# Patient Record
Sex: FEMALE | Race: WHITE | HISPANIC OR LATINO
[De-identification: names, ages, dates, MRNs, and addresses within clinical notes are randomized per-mention and may not be internally consistent; named-entity substitution may affect disease eponyms.]

---

## 2018-10-17 PROBLEM — Z00.00 ENCOUNTER FOR PREVENTIVE HEALTH EXAMINATION: Status: ACTIVE | Noted: 2018-10-17

## 2018-10-23 ENCOUNTER — APPOINTMENT (OUTPATIENT)
Dept: NEUROLOGY | Facility: CLINIC | Age: 53
End: 2018-10-23
Payer: COMMERCIAL

## 2018-10-23 VITALS
OXYGEN SATURATION: 98 % | DIASTOLIC BLOOD PRESSURE: 98 MMHG | SYSTOLIC BLOOD PRESSURE: 170 MMHG | TEMPERATURE: 98.1 F | HEART RATE: 74 BPM

## 2018-10-23 DIAGNOSIS — E11.9 TYPE 2 DIABETES MELLITUS W/OUT COMPLICATIONS: ICD-10-CM

## 2018-10-23 DIAGNOSIS — I10 ESSENTIAL (PRIMARY) HYPERTENSION: ICD-10-CM

## 2018-10-23 DIAGNOSIS — Z78.9 OTHER SPECIFIED HEALTH STATUS: ICD-10-CM

## 2018-10-23 PROCEDURE — 99205 OFFICE O/P NEW HI 60 MIN: CPT

## 2018-10-23 RX ORDER — METFORMIN HYDROCHLORIDE 1000 MG/1
1000 TABLET, COATED ORAL
Refills: 0 | Status: ACTIVE | COMMUNITY
Start: 2018-10-23

## 2019-05-07 ENCOUNTER — APPOINTMENT (OUTPATIENT)
Dept: NEUROLOGY | Facility: CLINIC | Age: 54
End: 2019-05-07
Payer: COMMERCIAL

## 2019-05-07 VITALS
OXYGEN SATURATION: 96 % | HEIGHT: 62 IN | DIASTOLIC BLOOD PRESSURE: 67 MMHG | RESPIRATION RATE: 16 BRPM | TEMPERATURE: 98 F | BODY MASS INDEX: 27.23 KG/M2 | WEIGHT: 148 LBS | SYSTOLIC BLOOD PRESSURE: 129 MMHG | HEART RATE: 78 BPM

## 2019-05-07 PROCEDURE — 99215 OFFICE O/P EST HI 40 MIN: CPT

## 2019-05-07 NOTE — HISTORY OF PRESENT ILLNESS
[FreeTextEntry1] : Mrs. Levi is a 54 year old woman with an incidental right cavernous ICA aneurysm.  Last MRA head showed the aneurysm , 4.5 mm x 4.2 mm right cavernous ICA aneurysm with a 3.8 mm neck projecting inferiorly. She reports having migraines, she believes related to stress and is on Topamax. She is followed by Dr. Rowell for the headaches. She denies any problems with vision at this time. Repeat MRA from 4/22/19 was reviewed by me and shows no change in the size or configuration of the aneurysm.

## 2019-05-07 NOTE — DISCUSSION/SUMMARY
[FreeTextEntry1] : Mrs. Pinon is a 54 year old woman who presents with an incidental 4.5 mm x 4.2 mm cavernous right ICA aneurysm discovered by MRA performed for migraine headaches. Repeat MRA head on 4/22/19 shows no changes. As discussed at prior visit, there is no chance of this aneurysm causing a subarachnoid hemorrhage and if it bled, it would cause a cavernous carotid fistula. As such, no intervention is required for this aneurysm at this time and conservative management is recommended. We discussed the importance of risk factor modifications such as blood pressure control and exercise. Plan for repeat MRA in 2 years and I will see her again at that time. All questions and concerns addressed.

## 2019-05-07 NOTE — PHYSICAL EXAM
[General Appearance - Well Nourished] : well nourished [General Appearance - Alert] : alert [General Appearance - Well Developed] : well developed [Oriented To Time, Place, And Person] : oriented to person, place, and time [Affect] : the affect was normal [Mood] : the mood was normal [Person] : oriented to person [Place] : oriented to place [Time] : oriented to time [Concentration Intact] : normal concentrating ability [Visual Intact] : visual attention was ~T not ~L decreased [Naming Objects] : no difficulty naming common objects [Writing A Sentence] : no difficulty writing a sentence [Repeating Phrases] : no difficulty repeating a phrase [Fluency] : fluency intact [Comprehension] : comprehension intact [Past History] : adequate knowledge of personal past history [Reading] : reading intact [Cranial Nerves Oculomotor (III)] : extraocular motion intact [Cranial Nerves Optic (II)] : visual acuity intact bilaterally,  visual fields full to confrontation, pupils equal round and reactive to light [Cranial Nerves Trigeminal (V)] : facial sensation intact symmetrically [Cranial Nerves Facial (VII)] : face symmetrical [Cranial Nerves Vestibulocochlear (VIII)] : hearing was intact bilaterally [Cranial Nerves Glossopharyngeal (IX)] : tongue and palate midline [Cranial Nerves Accessory (XI - Cranial And Spinal)] : head turning and shoulder shrug symmetric [Cranial Nerves Hypoglossal (XII)] : there was no tongue deviation with protrusion [Motor Tone] : muscle tone was normal in all four extremities [Motor Strength] : muscle strength was normal in all four extremities [Motor Handedness Right-Handed] : the patient is right hand dominant [No Muscle Atrophy] : normal bulk in all four extremities [Sensation Tactile Decrease] : light touch was intact [Balance] : balance was intact [2+] : Ankle jerk left 2+ [Extraocular Movements] : extraocular movements were intact [Optic Disc Abnormality] : the optic disc were normal in size and color [Full Visual Field] : full visual field [Hearing Threshold Finger Rub Not Miner] : hearing was normal [Normal] : normal [Neck Appearance] : the appearance of the neck was normal [] : no respiratory distress [Heart Rate And Rhythm] : heart rate was normal and rhythm regular [Edema] : there was no peripheral edema [Abdomen Soft] : soft [Abnormal Walk] : normal gait [Involuntary Movements] : no involuntary movements were seen [Skin Color & Pigmentation] : normal skin color and pigmentation [Musculoskeletal - Swelling] : no joint swelling seen [Skin Turgor] : normal skin turgor [Paresis Pronator Drift Right-Sided] : no pronator drift on the right [Motor Strength Upper Extremities Bilaterally] : strength was normal in both upper extremities [Motor Strength Lower Extremities Bilaterally] : strength was normal in both lower extremities [Paresis Pronator Drift Left-Sided] : no pronator drift on the left [Romberg's Sign] : Romberg's sign was negtive [Plantar Reflex Left Only] : normal on the left [Plantar Reflex Right Only] : normal on the right

## 2019-05-07 NOTE — CONSULT LETTER
[Dear  ___] : Dear  [unfilled], [Please see my note below.] : Please see my note below. [Consult Letter:] : I had the pleasure of evaluating your patient, [unfilled]. [Consult Closing:] : Thank you very much for allowing me to participate in the care of this patient.  If you have any questions, please do not hesitate to contact me. [Sincerely,] : Sincerely, [FreeTextEntry3] : Jose Lopez MD\par Chief, Vascular Neurology\par  of Neurology and Radiology\par [facility]\par Director, Comprehensive Stroke Center and Stroke Unit\par [facility]\par Attending, Interventional Neuroradiology\par \par

## 2019-05-07 NOTE — REVIEW OF SYSTEMS
[Tension Headache] : tension-type headaches [Fever] : no fever [Feeling Tired] : not feeling tired [Chills] : no chills [Feeling Poorly] : not feeling poorly [Memory Lapses or Loss] : no memory loss [Confused or Disoriented] : no confusion [Difficulty with Language] : no ~M difficulty with language [Decr. Concentrating Ability] : no decrease in concentrating ability [Changed Thought Patterns] : no change in thought patterns [Repeating Questions] : no repeated questioning about recent events [Arm Weakness] : no arm weakness [Facial Weakness] : no facial weakness [Leg Weakness] : no leg weakness [Hand Weakness] : no hand weakness [Difficulty Writing] : no difficulty writing [Difficulties in Speech] : no speech difficulties [Poor Coordination] : good coordination [Numbness] : no numbness [Tingling] : no tingling [Dizziness] : no dizziness [Seizures] : no convulsions [Lightheadedness] : no lightheadedness [Vertigo] : no vertigo [Fainting] : no fainting [Inability to Walk] : able to walk [Difficulty Walking] : no difficulty walking [Ataxia] : no ataxia [Suicidal] : not suicidal [Frequent Falls] : not falling [Anxiety] : no anxiety [Depression] : no depression [Eye Pain] : no eye pain [Earache] : no earache [Loss Of Hearing] : no hearing loss [Red Eyes] : eyes not red [Nosebleeds] : no nosebleeds [Wheezing] : no wheezing [Shortness Of Breath] : no shortness of breath [Cough] : no cough [Abdominal Pain] : no abdominal pain [Vomiting] : no vomiting [Constipation] : no constipation [Dysuria] : no dysuria [Incontinence] : no incontinence [Diarrhea] : no diarrhea [Joint Swelling] : no joint swelling [Joint Pain] : no joint pain [Skin Wound] : no skin wound [Joint Stiffness] : no joint stiffness [Skin Lesions] : no skin lesions [Easy Bleeding] : no tendency for easy bleeding

## 2020-09-29 ENCOUNTER — NON-APPOINTMENT (OUTPATIENT)
Age: 55
End: 2020-09-29

## 2020-09-29 ENCOUNTER — APPOINTMENT (OUTPATIENT)
Dept: OPHTHALMOLOGY | Facility: CLINIC | Age: 55
End: 2020-09-29
Payer: COMMERCIAL

## 2020-09-29 PROCEDURE — 92133 CPTRZD OPH DX IMG PST SGM ON: CPT

## 2020-09-29 PROCEDURE — 99203 OFFICE O/P NEW LOW 30 MIN: CPT

## 2020-09-29 PROCEDURE — 92083 EXTENDED VISUAL FIELD XM: CPT

## 2021-05-12 ENCOUNTER — RESULT REVIEW (OUTPATIENT)
Age: 56
End: 2021-05-12

## 2021-10-06 PROBLEM — I10 ESSENTIAL HYPERTENSION: Status: ACTIVE | Noted: 2018-10-23

## 2022-11-15 ENCOUNTER — APPOINTMENT (OUTPATIENT)
Dept: NEUROLOGY | Facility: CLINIC | Age: 57
End: 2022-11-15

## 2022-11-15 VITALS
WEIGHT: 152 LBS | SYSTOLIC BLOOD PRESSURE: 152 MMHG | DIASTOLIC BLOOD PRESSURE: 83 MMHG | HEART RATE: 66 BPM | RESPIRATION RATE: 16 BRPM | BODY MASS INDEX: 27.97 KG/M2 | HEIGHT: 62 IN

## 2022-11-15 DIAGNOSIS — G43.909 MIGRAINE, UNSPECIFIED, NOT INTRACTABLE, W/OUT STATUS MIGRAINOSUS: ICD-10-CM

## 2022-11-15 PROCEDURE — 99204 OFFICE O/P NEW MOD 45 MIN: CPT

## 2022-11-15 RX ORDER — VENLAFAXINE HYDROCHLORIDE 37.5 MG/1
37.5 CAPSULE, EXTENDED RELEASE ORAL DAILY
Refills: 0 | Status: ACTIVE | COMMUNITY

## 2022-11-15 RX ORDER — RAMIPRIL 10 MG/1
10 CAPSULE ORAL DAILY
Refills: 0 | Status: ACTIVE | COMMUNITY

## 2022-11-15 RX ORDER — MELOXICAM 15 MG/1
15 TABLET ORAL DAILY
Refills: 0 | Status: ACTIVE | COMMUNITY

## 2022-11-15 RX ORDER — LISINOPRIL 5 MG/1
5 TABLET ORAL DAILY
Refills: 0 | Status: DISCONTINUED | COMMUNITY
Start: 2018-10-23 | End: 2022-11-15

## 2022-11-15 NOTE — HISTORY OF PRESENT ILLNESS
[FreeTextEntry1] : Mrs. Levi is a 57 year old woman with an incidental right cavernous ICA aneurysm. Last MRA head on 2019 showed the aneurysm, 4.5 mm x 4.2 mm right cavernous ICA aneurysm with a 3.8 mm neck projecting inferiorly. She reports having a long history of migraines, on Effexor, looking for a new general neurologist as Dr. Rowell retired. She has not had any repeat imaging since 2019. She denies any stroke/TIA symptoms.

## 2022-11-15 NOTE — DISCUSSION/SUMMARY
[FreeTextEntry1] : Mrs. Pinon is a 57 year old woman who presents with an incidental 4.5 mm x 4.2 mm cavernous right ICA aneurysm discovered by MRA performed for migraine headaches. Repeat MRA head on 4/22/19 shows no changes. there is no role for treating this aneurysm unless it significantly enlarges. Plan for repeat MRA Head and will see her in the office after repeat imaging. I will refer to Dr. Carlos Romero for migraine management.  All questions and concerns addressed. \par

## 2022-11-15 NOTE — PHYSICAL EXAM

## 2022-11-15 NOTE — REVIEW OF SYSTEMS
[Tension Headache] : tension-type headaches [Fever] : no fever [Chills] : no chills [Feeling Poorly] : not feeling poorly [Feeling Tired] : not feeling tired [Confused or Disoriented] : no confusion [Memory Lapses or Loss] : no memory loss [Decr. Concentrating Ability] : no decrease in concentrating ability [Difficulty with Language] : no ~M difficulty with language [Changed Thought Patterns] : no change in thought patterns [Repeating Questions] : no repeated questioning about recent events [Facial Weakness] : no facial weakness [Arm Weakness] : no arm weakness [Hand Weakness] : no hand weakness [Leg Weakness] : no leg weakness [Poor Coordination] : good coordination [Difficulty Writing] : no difficulty writing [Difficulties in Speech] : no speech difficulties [Numbness] : no numbness [Tingling] : no tingling [Seizures] : no convulsions [Dizziness] : no dizziness [Fainting] : no fainting [Lightheadedness] : no lightheadedness [Vertigo] : no vertigo [Difficulty Walking] : no difficulty walking [Inability to Walk] : able to walk [Anxiety] : no anxiety [Depression] : no depression [Eyesight Problems] : no eyesight problems [Loss Of Hearing] : no hearing loss [Chest Pain] : no chest pain [Palpitations] : no palpitations [Shortness Of Breath] : no shortness of breath [Wheezing] : no wheezing [Cough] : no cough [Constipation] : no constipation [Diarrhea] : no diarrhea [Incontinence] : no incontinence [Joint Pain] : no joint pain [Easy Bleeding] : no tendency for easy bleeding [Easy Bruising] : no tendency for easy bruising

## 2022-11-27 ENCOUNTER — APPOINTMENT (OUTPATIENT)
Dept: MRI IMAGING | Facility: CLINIC | Age: 57
End: 2022-11-27

## 2022-11-27 ENCOUNTER — OUTPATIENT (OUTPATIENT)
Dept: OUTPATIENT SERVICES | Facility: HOSPITAL | Age: 57
LOS: 1 days | End: 2022-11-27
Payer: COMMERCIAL

## 2022-11-27 DIAGNOSIS — I67.1 CEREBRAL ANEURYSM, NONRUPTURED: ICD-10-CM

## 2022-11-27 PROCEDURE — 70544 MR ANGIOGRAPHY HEAD W/O DYE: CPT | Mod: 26

## 2022-11-27 PROCEDURE — 70544 MR ANGIOGRAPHY HEAD W/O DYE: CPT

## 2022-12-20 ENCOUNTER — APPOINTMENT (OUTPATIENT)
Dept: NEUROLOGY | Facility: CLINIC | Age: 57
End: 2022-12-20

## 2022-12-20 VITALS — SYSTOLIC BLOOD PRESSURE: 150 MMHG | DIASTOLIC BLOOD PRESSURE: 76 MMHG | HEART RATE: 73 BPM

## 2022-12-20 DIAGNOSIS — I67.1 CEREBRAL ANEURYSM, NONRUPTURED: ICD-10-CM

## 2022-12-20 PROCEDURE — 99215 OFFICE O/P EST HI 40 MIN: CPT

## 2022-12-20 RX ORDER — ERENUMAB-AOOE 70 MG/ML
70 INJECTION SUBCUTANEOUS
Refills: 0 | Status: ACTIVE | COMMUNITY

## 2022-12-20 NOTE — PHYSICAL EXAM

## 2022-12-20 NOTE — HISTORY OF PRESENT ILLNESS
[FreeTextEntry1] : Mrs. Levi is a 57 year old woman with a PMHx of HTN, DM and a known incidental right cavernous ICA aneurysm found while being worked up for migraines. Repeat MRA Head 12/2022: stable 5 mm right cavernous ICA aneurysm. Recent MRA compared to her MRA in 2019. She was evaluated by Dr. Mallory for her migraines and started on Aimovig injection. She denies any stroke/TIA symptoms.

## 2022-12-20 NOTE — DISCUSSION/SUMMARY
[FreeTextEntry1] : Mrs. Levi is a 57 year old woman with a PMHx of HTN, DM and a known incidental right cavernous ICA aneurysm. Repeat MRA Head compared to 2019 shows aneurysm is stable in size. We discussed there is no role for treatment at this time unless it significantly enlarges. Plan for repeat MRA HEAD in 1 year. She will continue to follow with Dr. Mallory for her migraines. All of her questions and concerns were addressed.

## 2022-12-20 NOTE — REVIEW OF SYSTEMS
[Fever] : no fever [Chills] : no chills [Feeling Poorly] : not feeling poorly [Feeling Tired] : not feeling tired [Confused or Disoriented] : no confusion [Memory Lapses or Loss] : no memory loss [Decr. Concentrating Ability] : no decrease in concentrating ability [Difficulty with Language] : no ~M difficulty with language [Changed Thought Patterns] : no change in thought patterns [Repeating Questions] : no repeated questioning about recent events [Facial Weakness] : no facial weakness [Arm Weakness] : no arm weakness [Hand Weakness] : no hand weakness [Leg Weakness] : no leg weakness [Poor Coordination] : good coordination [Difficulty Writing] : no difficulty writing [Difficulties in Speech] : no speech difficulties [Numbness] : no numbness [Seizures] : no convulsions [Dizziness] : no dizziness [Lightheadedness] : no lightheadedness [Vertigo] : no vertigo [Tension Headache] : no tension-type headache [Difficulty Walking] : no difficulty walking [Anxiety] : no anxiety [Depression] : no depression [Eyesight Problems] : no eyesight problems [Loss Of Hearing] : no hearing loss [Chest Pain] : no chest pain [Palpitations] : no palpitations [Shortness Of Breath] : no shortness of breath [Wheezing] : no wheezing [Vomiting] : no vomiting [Easy Bleeding] : no tendency for easy bleeding [Easy Bruising] : no tendency for easy bruising

## 2023-06-13 ENCOUNTER — INPATIENT (INPATIENT)
Facility: HOSPITAL | Age: 58
LOS: 1 days | Discharge: HOME | DRG: 313 | End: 2023-06-15
Attending: INTERNAL MEDICINE | Admitting: INTERNAL MEDICINE
Payer: COMMERCIAL

## 2023-06-13 VITALS
WEIGHT: 154.98 LBS | HEIGHT: 62 IN | SYSTOLIC BLOOD PRESSURE: 174 MMHG | HEART RATE: 68 BPM | OXYGEN SATURATION: 97 % | TEMPERATURE: 98 F | RESPIRATION RATE: 16 BRPM | DIASTOLIC BLOOD PRESSURE: 88 MMHG

## 2023-06-13 LAB
ALBUMIN SERPL ELPH-MCNC: 4.5 G/DL — SIGNIFICANT CHANGE UP (ref 3.3–5)
ALP SERPL-CCNC: 103 U/L — SIGNIFICANT CHANGE UP (ref 40–120)
ALT FLD-CCNC: 34 U/L — SIGNIFICANT CHANGE UP (ref 10–45)
ANION GAP SERPL CALC-SCNC: 12 MMOL/L — SIGNIFICANT CHANGE UP (ref 5–17)
APTT BLD: 32 SEC — SIGNIFICANT CHANGE UP (ref 27.5–35.5)
AST SERPL-CCNC: 27 U/L — SIGNIFICANT CHANGE UP (ref 10–40)
BASOPHILS # BLD AUTO: 0.05 K/UL — SIGNIFICANT CHANGE UP (ref 0–0.2)
BASOPHILS NFR BLD AUTO: 0.7 % — SIGNIFICANT CHANGE UP (ref 0–2)
BILIRUB SERPL-MCNC: 0.3 MG/DL — SIGNIFICANT CHANGE UP (ref 0.2–1.2)
BUN SERPL-MCNC: 14 MG/DL — SIGNIFICANT CHANGE UP (ref 7–23)
CALCIUM SERPL-MCNC: 9.5 MG/DL — SIGNIFICANT CHANGE UP (ref 8.4–10.5)
CHLORIDE SERPL-SCNC: 102 MMOL/L — SIGNIFICANT CHANGE UP (ref 96–108)
CO2 SERPL-SCNC: 24 MMOL/L — SIGNIFICANT CHANGE UP (ref 22–31)
CREAT SERPL-MCNC: 0.56 MG/DL — SIGNIFICANT CHANGE UP (ref 0.5–1.3)
D DIMER BLD IA.RAPID-MCNC: <150 NG/ML DDU — SIGNIFICANT CHANGE UP
EGFR: 105 ML/MIN/1.73M2 — SIGNIFICANT CHANGE UP
EOSINOPHIL # BLD AUTO: 0.21 K/UL — SIGNIFICANT CHANGE UP (ref 0–0.5)
EOSINOPHIL NFR BLD AUTO: 3 % — SIGNIFICANT CHANGE UP (ref 0–6)
GLUCOSE SERPL-MCNC: 158 MG/DL — HIGH (ref 70–99)
HCT VFR BLD CALC: 38.5 % — SIGNIFICANT CHANGE UP (ref 34.5–45)
HGB BLD-MCNC: 13 G/DL — SIGNIFICANT CHANGE UP (ref 11.5–15.5)
IMM GRANULOCYTES NFR BLD AUTO: 0.4 % — SIGNIFICANT CHANGE UP (ref 0–0.9)
INR BLD: 0.95 RATIO — SIGNIFICANT CHANGE UP (ref 0.88–1.16)
LIDOCAIN IGE QN: 25 U/L — SIGNIFICANT CHANGE UP (ref 7–60)
LYMPHOCYTES # BLD AUTO: 2.43 K/UL — SIGNIFICANT CHANGE UP (ref 1–3.3)
LYMPHOCYTES # BLD AUTO: 34.9 % — SIGNIFICANT CHANGE UP (ref 13–44)
MAGNESIUM SERPL-MCNC: 1.7 MG/DL — SIGNIFICANT CHANGE UP (ref 1.6–2.6)
MCHC RBC-ENTMCNC: 30.3 PG — SIGNIFICANT CHANGE UP (ref 27–34)
MCHC RBC-ENTMCNC: 33.8 GM/DL — SIGNIFICANT CHANGE UP (ref 32–36)
MCV RBC AUTO: 89.7 FL — SIGNIFICANT CHANGE UP (ref 80–100)
MONOCYTES # BLD AUTO: 0.63 K/UL — SIGNIFICANT CHANGE UP (ref 0–0.9)
MONOCYTES NFR BLD AUTO: 9 % — SIGNIFICANT CHANGE UP (ref 2–14)
NEUTROPHILS # BLD AUTO: 3.62 K/UL — SIGNIFICANT CHANGE UP (ref 1.8–7.4)
NEUTROPHILS NFR BLD AUTO: 52 % — SIGNIFICANT CHANGE UP (ref 43–77)
NRBC # BLD: 0 /100 WBCS — SIGNIFICANT CHANGE UP (ref 0–0)
PHOSPHATE SERPL-MCNC: 3.2 MG/DL — SIGNIFICANT CHANGE UP (ref 2.5–4.5)
PLATELET # BLD AUTO: 272 K/UL — SIGNIFICANT CHANGE UP (ref 150–400)
POTASSIUM SERPL-MCNC: 3.9 MMOL/L — SIGNIFICANT CHANGE UP (ref 3.5–5.3)
POTASSIUM SERPL-SCNC: 3.9 MMOL/L — SIGNIFICANT CHANGE UP (ref 3.5–5.3)
PROT SERPL-MCNC: 7.1 G/DL — SIGNIFICANT CHANGE UP (ref 6–8.3)
PROTHROM AB SERPL-ACNC: 11 SEC — SIGNIFICANT CHANGE UP (ref 10.5–13.4)
RBC # BLD: 4.29 M/UL — SIGNIFICANT CHANGE UP (ref 3.8–5.2)
RBC # FLD: 12.4 % — SIGNIFICANT CHANGE UP (ref 10.3–14.5)
SODIUM SERPL-SCNC: 138 MMOL/L — SIGNIFICANT CHANGE UP (ref 135–145)
TROPONIN T, HIGH SENSITIVITY RESULT: <6 NG/L — SIGNIFICANT CHANGE UP (ref 0–51)
TROPONIN T, HIGH SENSITIVITY RESULT: <6 NG/L — SIGNIFICANT CHANGE UP (ref 0–51)
WBC # BLD: 6.97 K/UL — SIGNIFICANT CHANGE UP (ref 3.8–10.5)
WBC # FLD AUTO: 6.97 K/UL — SIGNIFICANT CHANGE UP (ref 3.8–10.5)

## 2023-06-13 PROCEDURE — 71046 X-RAY EXAM CHEST 2 VIEWS: CPT | Mod: 26

## 2023-06-13 PROCEDURE — 99285 EMERGENCY DEPT VISIT HI MDM: CPT

## 2023-06-13 RX ORDER — ACETAMINOPHEN 500 MG
975 TABLET ORAL ONCE
Refills: 0 | Status: COMPLETED | OUTPATIENT
Start: 2023-06-13 | End: 2023-06-13

## 2023-06-13 RX ORDER — ASPIRIN/CALCIUM CARB/MAGNESIUM 324 MG
162 TABLET ORAL ONCE
Refills: 0 | Status: COMPLETED | OUTPATIENT
Start: 2023-06-13 | End: 2023-06-13

## 2023-06-13 RX ADMIN — Medication 975 MILLIGRAM(S): at 20:43

## 2023-06-13 RX ADMIN — Medication 162 MILLIGRAM(S): at 20:44

## 2023-06-13 NOTE — ED PROVIDER NOTE - ATTENDING CONTRIBUTION TO CARE
Attending MD Rinaldi:  I personally have seen and examined this patient. I have performed a substantive portion of the visit including all aspects of the medical decision making.  Resident note reviewed and agree on plan of care and except where noted.      58-year-old woman with a history of hypertension, cerebral aneurysm, diabetes presenting for evaluation of intermittent left anterior chest discomfort radiating to the left arm.  Somewhat pleuritic in nature.  Also seems to be worse with raising her arm up.  She has not taken anything for the pain as of yet.  She did feel a bit nauseous but no emesis.  No diaphoresis or shortness of breath.  Currently has pain at this time.  Sharp in nature.    Vital signs are notable for elevated blood pressure currently 190/80, patient well-appearing sitting in the stretcher no apparent distress.  Clear lungs anteriorly.  Radial pulses full and equal in bilateral upper extremities DP pulses present equal bilateral lower extremities.  Abdomen nontender.  Moves all extremities spontaneously.    ECG without diagnostic ischemic findings.    Differential diagnosis includes but is not limited to nonspecific pleurisy, ACS, pulmonary embolism.  I have considered aortic dissection but do not find this likely given patient's well appearance and since pain is not tearing in nature or abrupt.  We will send cardiac biomarkers D-dimer chest x-ray maintain on telemetry and reassess.         *The above represents an initial assessment/impression. Please refer to progress notes for potential changes in patient clinical course*

## 2023-06-13 NOTE — ED PROVIDER NOTE - OBJECTIVE STATEMENT
This is a 58 year old female with pmh of DM, HTN, migraine, brain aneurysm presenting with 2 days of L sided chest pain, radiating to L arm, This is a 58 year old female with pmh of DM, HTN, migraine, brain aneurysm presenting with 2 days of L sided sharp chest pain, radiating to L arm associated with mild shortness of breath, + pleuritic pain, non exertional. Has had similar episodes 3 years ago, which she was worked up with echo/stress, which was normal. Denies any nausea, vomiting, sweating. Denies any abdominal pain, urinary sxs.

## 2023-06-13 NOTE — ED ADULT NURSE NOTE - NSFALLUNIVINTERV_ED_ALL_ED
Bed/Stretcher in lowest position, wheels locked, appropriate side rails in place/Call bell, personal items and telephone in reach/Instruct patient to call for assistance before getting out of bed/chair/stretcher/Non-slip footwear applied when patient is off stretcher/[place] to call system/Physically safe environment - no spills, clutter or unnecessary equipment/Purposeful proactive rounding/Room/bathroom lighting operational, light cord in reach

## 2023-06-13 NOTE — ED ADULT NURSE NOTE - OBJECTIVE STATEMENT
Pt 58 year old female, A/O x4. Pt came in due to left side chest pain radiating to left arm. PMH- HTN, DM. As per pt, she was standing in kitchen when she had sudden onset of generalized  chest pain. Pain was consistent and sharp. Today, pan localized to left side of chest and left arm. Upon assessment, pt rubbing left chest. Skin- warm, dry, intact. NSR on cardiac monitor. Abd. soft, nontender, nondistended. Complaining of  nausea also. Denies sob, fever, chills, vomiting, ha, numbness/ tingling.

## 2023-06-13 NOTE — ED PROVIDER NOTE - NSFOLLOWUPINSTRUCTIONS_ED_ALL_ED_FT
No signs of emergency medical condition on today's workup.  Presumptive diagnosis made, but further evaluation may be required by your primary care doctor or specialist for a definitive diagnosis.  Therefore, follow up as directed and if symptoms change/worsen or any emergency conditions, please return to the ER.    YOU WERE SEEN FOR chest pain    YOU HAD labs and chest x ray done.   You can take ibuprofen 400mg every 6 to 8 hours and Tylenol 1000mg every 6 to 8 hours as needed for pain.   Please follow up with a cardiologist as soon as you can     RETURN TO THE EMERGENCY DEPARTMENT FOR worsening chest pain, shortness of breath, or any new/concerning symptoms.

## 2023-06-13 NOTE — ED PROVIDER NOTE - RAPID ASSESSMENT
58 yea masoodd female with pmhx dm, htn, back pain, migraine, brain aneurysm,  with chest pain at left sided across the chest yesterday resolved after 2 hours. woke up today, started to have left side chest pain to the back, tried to eat but then felt nauseous. no prior episodes. still has the chest pain     Patient was rapidly assessed via a telemedicine and/or role of Quick Triage Doctor; a limited history, physical exam and assessment was performed. The patient will be seen and further evaluated in the main emergency department. The remainder of care and evaluation will be conducted by the primary emergency medicine team. Receiving team will follow up on labs, imaging and serially reassess patient as indicated. All further decisions regarding patient care, evaluation and disposition are at the discretion of the receiving primary emergency department team.

## 2023-06-13 NOTE — ED PROVIDER NOTE - PHYSICAL EXAMINATION
General: NAD  HEENT: NCAT.   Cardiac: RRR, 2+ radial pulses  Chest: CTA  Abdomen: soft, non-distended, no ttp, no rebound or guarding  Extremities: no peripheral edema, calf tenderness, or leg size discrepancies  Skin: no rashes  Neuro: AAOx3, motor and sensory grossly intact.  Psych: mood and affect appropriate

## 2023-06-13 NOTE — ED PROVIDER NOTE - CLINICAL SUMMARY MEDICAL DECISION MAKING FREE TEXT BOX
This is a 58 year old female with pmh of DM, HTN, migraine, brain aneurysm presenting with 2 days of L sided sharp chest pain, radiating to L arm associated with mild shortness of breath, + pleuritic pain, non exertional. Low heart score at this time. Trop negative. Will repeat 3 hour trop. Dimer negative. Low concern for PE at this time. Will reassess. Most likely dc with cardiologist f/u. This is a 58 year old female with pmh of DM, HTN, migraine, brain aneurysm presenting with 2 days of L sided sharp chest pain, radiating to L arm associated with mild shortness of breath, + pleuritic pain, non exertional. ekg non concerning. Low heart score at this time. Trop negative. Will repeat 3 hour trop. Dimer negative. Low concern for PE at this time. Will reassess. Most likely dc with cardiologist f/u.

## 2023-06-13 NOTE — ED PROVIDER NOTE - NSTIMEPROVIDERCAREINITIATE_GEN_ER
NOTIFICATION RETURN TO WORK / SCHOOL 
 
1/30/2020 2:08 PM 
 
Mr. Colten Dos Santos [phone] [id number] To Whom It May Concern: 
 
Colten Dos Santos is currently under the care of Dorothy Kinsey. Please excuse him for 1/21/2020-1/23/2020. If there are questions or concerns please have the patient contact our office.  
 
 
 
Sincerely, 
 
 
Winsome Fernandez MD 
 
                                
 
 13-Jun-2023 18:30

## 2023-06-14 ENCOUNTER — TRANSCRIPTION ENCOUNTER (OUTPATIENT)
Age: 58
End: 2023-06-14

## 2023-06-14 DIAGNOSIS — R07.9 CHEST PAIN, UNSPECIFIED: ICD-10-CM

## 2023-06-14 DIAGNOSIS — I10 ESSENTIAL (PRIMARY) HYPERTENSION: ICD-10-CM

## 2023-06-14 DIAGNOSIS — Z98.890 OTHER SPECIFIED POSTPROCEDURAL STATES: Chronic | ICD-10-CM

## 2023-06-14 DIAGNOSIS — J45.909 UNSPECIFIED ASTHMA, UNCOMPLICATED: ICD-10-CM

## 2023-06-14 DIAGNOSIS — E11.9 TYPE 2 DIABETES MELLITUS WITHOUT COMPLICATIONS: ICD-10-CM

## 2023-06-14 DIAGNOSIS — Z90.49 ACQUIRED ABSENCE OF OTHER SPECIFIED PARTS OF DIGESTIVE TRACT: Chronic | ICD-10-CM

## 2023-06-14 LAB
A1C WITH ESTIMATED AVERAGE GLUCOSE RESULT: 7.2 % — HIGH (ref 4–5.6)
ALBUMIN SERPL ELPH-MCNC: 4.1 G/DL — SIGNIFICANT CHANGE UP (ref 3.3–5)
ALP SERPL-CCNC: 80 U/L — SIGNIFICANT CHANGE UP (ref 40–120)
ALT FLD-CCNC: 34 U/L — SIGNIFICANT CHANGE UP (ref 10–45)
ANION GAP SERPL CALC-SCNC: 11 MMOL/L — SIGNIFICANT CHANGE UP (ref 5–17)
AST SERPL-CCNC: 26 U/L — SIGNIFICANT CHANGE UP (ref 10–40)
BASOPHILS # BLD AUTO: 0.04 K/UL — SIGNIFICANT CHANGE UP (ref 0–0.2)
BASOPHILS NFR BLD AUTO: 0.7 % — SIGNIFICANT CHANGE UP (ref 0–2)
BILIRUB SERPL-MCNC: 0.7 MG/DL — SIGNIFICANT CHANGE UP (ref 0.2–1.2)
BUN SERPL-MCNC: 12 MG/DL — SIGNIFICANT CHANGE UP (ref 7–23)
CALCIUM SERPL-MCNC: 8.9 MG/DL — SIGNIFICANT CHANGE UP (ref 8.4–10.5)
CHLORIDE SERPL-SCNC: 105 MMOL/L — SIGNIFICANT CHANGE UP (ref 96–108)
CHOLEST SERPL-MCNC: 144 MG/DL — SIGNIFICANT CHANGE UP
CO2 SERPL-SCNC: 24 MMOL/L — SIGNIFICANT CHANGE UP (ref 22–31)
CREAT SERPL-MCNC: 0.54 MG/DL — SIGNIFICANT CHANGE UP (ref 0.5–1.3)
EGFR: 106 ML/MIN/1.73M2 — SIGNIFICANT CHANGE UP
EOSINOPHIL # BLD AUTO: 0.17 K/UL — SIGNIFICANT CHANGE UP (ref 0–0.5)
EOSINOPHIL NFR BLD AUTO: 2.9 % — SIGNIFICANT CHANGE UP (ref 0–6)
ESTIMATED AVERAGE GLUCOSE: 160 MG/DL — HIGH (ref 68–114)
GLUCOSE BLDC GLUCOMTR-MCNC: 143 MG/DL — HIGH (ref 70–99)
GLUCOSE BLDC GLUCOMTR-MCNC: 176 MG/DL — HIGH (ref 70–99)
GLUCOSE BLDC GLUCOMTR-MCNC: 179 MG/DL — HIGH (ref 70–99)
GLUCOSE SERPL-MCNC: 140 MG/DL — HIGH (ref 70–99)
HCT VFR BLD CALC: 36 % — SIGNIFICANT CHANGE UP (ref 34.5–45)
HDLC SERPL-MCNC: 50 MG/DL — LOW
HGB BLD-MCNC: 12.5 G/DL — SIGNIFICANT CHANGE UP (ref 11.5–15.5)
IMM GRANULOCYTES NFR BLD AUTO: 0.5 % — SIGNIFICANT CHANGE UP (ref 0–0.9)
LIPID PNL WITH DIRECT LDL SERPL: 78 MG/DL — SIGNIFICANT CHANGE UP
LYMPHOCYTES # BLD AUTO: 2.29 K/UL — SIGNIFICANT CHANGE UP (ref 1–3.3)
LYMPHOCYTES # BLD AUTO: 38.7 % — SIGNIFICANT CHANGE UP (ref 13–44)
MCHC RBC-ENTMCNC: 30 PG — SIGNIFICANT CHANGE UP (ref 27–34)
MCHC RBC-ENTMCNC: 34.7 GM/DL — SIGNIFICANT CHANGE UP (ref 32–36)
MCV RBC AUTO: 86.3 FL — SIGNIFICANT CHANGE UP (ref 80–100)
MONOCYTES # BLD AUTO: 0.56 K/UL — SIGNIFICANT CHANGE UP (ref 0–0.9)
MONOCYTES NFR BLD AUTO: 9.5 % — SIGNIFICANT CHANGE UP (ref 2–14)
NEUTROPHILS # BLD AUTO: 2.82 K/UL — SIGNIFICANT CHANGE UP (ref 1.8–7.4)
NEUTROPHILS NFR BLD AUTO: 47.7 % — SIGNIFICANT CHANGE UP (ref 43–77)
NON HDL CHOLESTEROL: 95 MG/DL — SIGNIFICANT CHANGE UP
NRBC # BLD: 0 /100 WBCS — SIGNIFICANT CHANGE UP (ref 0–0)
PLATELET # BLD AUTO: 261 K/UL — SIGNIFICANT CHANGE UP (ref 150–400)
POTASSIUM SERPL-MCNC: 3.6 MMOL/L — SIGNIFICANT CHANGE UP (ref 3.5–5.3)
POTASSIUM SERPL-SCNC: 3.6 MMOL/L — SIGNIFICANT CHANGE UP (ref 3.5–5.3)
PROT SERPL-MCNC: 6.7 G/DL — SIGNIFICANT CHANGE UP (ref 6–8.3)
RBC # BLD: 4.17 M/UL — SIGNIFICANT CHANGE UP (ref 3.8–5.2)
RBC # FLD: 12.3 % — SIGNIFICANT CHANGE UP (ref 10.3–14.5)
SODIUM SERPL-SCNC: 140 MMOL/L — SIGNIFICANT CHANGE UP (ref 135–145)
TRIGL SERPL-MCNC: 81 MG/DL — SIGNIFICANT CHANGE UP
WBC # BLD: 5.91 K/UL — SIGNIFICANT CHANGE UP (ref 3.8–10.5)
WBC # FLD AUTO: 5.91 K/UL — SIGNIFICANT CHANGE UP (ref 3.8–10.5)

## 2023-06-14 PROCEDURE — 78452 HT MUSCLE IMAGE SPECT MULT: CPT | Mod: 26

## 2023-06-14 PROCEDURE — 76376 3D RENDER W/INTRP POSTPROCES: CPT | Mod: 26

## 2023-06-14 PROCEDURE — 93016 CV STRESS TEST SUPVJ ONLY: CPT

## 2023-06-14 PROCEDURE — 93356 MYOCRD STRAIN IMG SPCKL TRCK: CPT

## 2023-06-14 PROCEDURE — 93018 CV STRESS TEST I&R ONLY: CPT

## 2023-06-14 PROCEDURE — 99223 1ST HOSP IP/OBS HIGH 75: CPT

## 2023-06-14 PROCEDURE — 93306 TTE W/DOPPLER COMPLETE: CPT | Mod: 26

## 2023-06-14 RX ORDER — GLUCAGON INJECTION, SOLUTION 0.5 MG/.1ML
1 INJECTION, SOLUTION SUBCUTANEOUS ONCE
Refills: 0 | Status: DISCONTINUED | OUTPATIENT
Start: 2023-06-14 | End: 2023-06-15

## 2023-06-14 RX ORDER — MELOXICAM 15 MG/1
1 TABLET ORAL
Refills: 0 | DISCHARGE

## 2023-06-14 RX ORDER — DEXTROSE 50 % IN WATER 50 %
15 SYRINGE (ML) INTRAVENOUS ONCE
Refills: 0 | Status: DISCONTINUED | OUTPATIENT
Start: 2023-06-14 | End: 2023-06-15

## 2023-06-14 RX ORDER — SODIUM CHLORIDE 9 MG/ML
1000 INJECTION, SOLUTION INTRAVENOUS
Refills: 0 | Status: DISCONTINUED | OUTPATIENT
Start: 2023-06-14 | End: 2023-06-15

## 2023-06-14 RX ORDER — GALCANEZUMAB 100 MG/ML
120 INJECTION, SOLUTION SUBCUTANEOUS
Refills: 0 | DISCHARGE

## 2023-06-14 RX ORDER — INSULIN LISPRO 100/ML
VIAL (ML) SUBCUTANEOUS AT BEDTIME
Refills: 0 | Status: DISCONTINUED | OUTPATIENT
Start: 2023-06-14 | End: 2023-06-15

## 2023-06-14 RX ORDER — ACETAMINOPHEN 500 MG
650 TABLET ORAL EVERY 6 HOURS
Refills: 0 | Status: DISCONTINUED | OUTPATIENT
Start: 2023-06-14 | End: 2023-06-15

## 2023-06-14 RX ORDER — IBUPROFEN 200 MG
600 TABLET ORAL EVERY 8 HOURS
Refills: 0 | Status: DISCONTINUED | OUTPATIENT
Start: 2023-06-14 | End: 2023-06-15

## 2023-06-14 RX ORDER — INSULIN LISPRO 100/ML
VIAL (ML) SUBCUTANEOUS
Refills: 0 | Status: DISCONTINUED | OUTPATIENT
Start: 2023-06-14 | End: 2023-06-15

## 2023-06-14 RX ORDER — DEXTROSE 50 % IN WATER 50 %
12.5 SYRINGE (ML) INTRAVENOUS ONCE
Refills: 0 | Status: DISCONTINUED | OUTPATIENT
Start: 2023-06-14 | End: 2023-06-15

## 2023-06-14 RX ORDER — LISINOPRIL 2.5 MG/1
40 TABLET ORAL DAILY
Refills: 0 | Status: DISCONTINUED | OUTPATIENT
Start: 2023-06-14 | End: 2023-06-15

## 2023-06-14 RX ORDER — DEXTROSE 50 % IN WATER 50 %
25 SYRINGE (ML) INTRAVENOUS ONCE
Refills: 0 | Status: DISCONTINUED | OUTPATIENT
Start: 2023-06-14 | End: 2023-06-15

## 2023-06-14 RX ORDER — ONDANSETRON 8 MG/1
4 TABLET, FILM COATED ORAL EVERY 8 HOURS
Refills: 0 | Status: DISCONTINUED | OUTPATIENT
Start: 2023-06-14 | End: 2023-06-15

## 2023-06-14 RX ORDER — METFORMIN HYDROCHLORIDE 850 MG/1
1 TABLET ORAL
Refills: 0 | DISCHARGE

## 2023-06-14 RX ORDER — LANOLIN ALCOHOL/MO/W.PET/CERES
3 CREAM (GRAM) TOPICAL AT BEDTIME
Refills: 0 | Status: DISCONTINUED | OUTPATIENT
Start: 2023-06-14 | End: 2023-06-15

## 2023-06-14 RX ORDER — RAMIPRIL 5 MG
1 CAPSULE ORAL
Refills: 0 | DISCHARGE

## 2023-06-14 RX ADMIN — Medication 1: at 17:56

## 2023-06-14 RX ADMIN — Medication 650 MILLIGRAM(S): at 10:06

## 2023-06-14 RX ADMIN — Medication 650 MILLIGRAM(S): at 17:10

## 2023-06-14 RX ADMIN — LISINOPRIL 40 MILLIGRAM(S): 2.5 TABLET ORAL at 06:16

## 2023-06-14 RX ADMIN — Medication 650 MILLIGRAM(S): at 17:57

## 2023-06-14 RX ADMIN — Medication 975 MILLIGRAM(S): at 01:35

## 2023-06-14 NOTE — H&P ADULT - HISTORY OF PRESENT ILLNESS
Patient is a 58 year old female with pmh of DMII, HTN, migraine, brain aneurysm presenting for chest pain for the past two days.   Patient reports left sided chest pain , characterized was sharp , radiating to the left arm, associated with shortness of breath , exacerbated with deep breathing , not worsened with exertion . No diaphoresis ,  nausea or vomiting   Patient is a 58 year old female with pmh of asthma , DMII, HTN, migraine on monthly Emgality , brain aneurysm presenting for chest pain for the past two days.   Patient reports left sided chest pain,  characterized was sharp , radiating to bilateral arms and back, associated with shortness of breath , exacerbated with cough and deep breathing , not worsened with exertion  started on two days prior to admission while at rest , and self resolved after a few hours without intervention . On day of admission , patient reports recurrence to the pain this time preceded by pressure like sensation across her chest accompanied by nausea but no vomviting. She initially attributed her symptoms to coughing she had last week associated with poor air quality.   She reports no fever or chills. No diaphoresis, no lower extremity edema.

## 2023-06-14 NOTE — H&P ADULT - ASSESSMENT
58 year old female with pmh of DMII, HTN, migraine, brain aneurysm presenting for chest pain for the past two days.

## 2023-06-14 NOTE — PROGRESS NOTE ADULT - SUBJECTIVE AND OBJECTIVE BOX
Chief complaint: chest pain    HPI:  Patient is a 58 year old female with pmh of asthma , DMII, HTN, migraine on monthly Emgality , brain aneurysm presenting for chest pain for the past two days.   Patient reports left sided chest pain,  characterized was sharp , radiating to bilateral arms and back, associated with shortness of breath , exacerbated with cough and deep breathing , not worsened with exertion  started on two days prior to admission while at rest , and self resolved after a few hours without intervention . On day of admission , patient reports recurrence to the pain this time preceded by pressure like sensation across her chest accompanied by nausea but no vomviting. She initially attributed her symptoms to coughing she had last week associated with poor air quality.   She reports no fever or chills. No diaphoresis, no lower extremity edema.     PAST MEDICAL & SURGICAL HISTORY:  Hypertension      Diabetes mellitus      Brain aneurysm      H/O migraine      Asthma      S/P cholecystectomy      H/O hernia repair    REVIEW OF SYSTEMS:    CONSTITUTIONAL: No fever, weight loss, or fatigue  NECK: No pain or stiffness  RESPIRATORY: No cough, wheezing, chills or hemoptysis; No shortness of breath  CARDIOVASCULAR: No chest pain, palpitations, dizziness, or leg swelling  GASTROINTESTINAL: No abdominal or epigastric pain. No nausea, vomiting, or hematemesis; No diarrhea or constipation. No melena or hematochezia.  GENITOURINARY: No dysuria, frequency, hematuria, or incontinence  NEUROLOGICAL: No headaches, memory loss, loss of strength, numbness, or tremors  SKIN: No itching, burning, rashes, or lesions   LYMPH NODES: No enlarged glands  MUSCULOSKELETAL: No joint pain or swelling; No muscle, back, or extremity pain  HEME/LYMPH: No easy bruising, or bleeding gums    MEDICATIONS  (STANDING):  dextrose 5%. 1000 milliLiter(s) (50 mL/Hr) IV Continuous <Continuous>  dextrose 5%. 1000 milliLiter(s) (100 mL/Hr) IV Continuous <Continuous>  dextrose 50% Injectable 25 Gram(s) IV Push once  dextrose 50% Injectable 12.5 Gram(s) IV Push once  glucagon  Injectable 1 milliGRAM(s) IntraMuscular once  insulin lispro (ADMELOG) corrective regimen sliding scale   SubCutaneous three times a day before meals  insulin lispro (ADMELOG) corrective regimen sliding scale   SubCutaneous at bedtime  lisinopril 40 milliGRAM(s) Oral daily    MEDICATIONS  (PRN):  acetaminophen     Tablet .. 650 milliGRAM(s) Oral every 6 hours PRN Temp greater or equal to 38C (100.4F), Mild Pain (1 - 3)  aluminum hydroxide/magnesium hydroxide/simethicone Suspension 30 milliLiter(s) Oral every 4 hours PRN Dyspepsia  dextrose Oral Gel 15 Gram(s) Oral once PRN Blood Glucose LESS THAN 70 milliGRAM(s)/deciliter  ibuprofen  Tablet. 600 milliGRAM(s) Oral every 8 hours PRN Moderate Pain (4 - 6)  melatonin 3 milliGRAM(s) Oral at bedtime PRN Insomnia  ondansetron Injectable 4 milliGRAM(s) IV Push every 8 hours PRN Nausea and/or Vomiting      Allergies    No Known Allergies    Intolerances        SOCIAL HISTORY: no smoker , no ETOH , No drug use     FAMILY HISTORY:  FH: diabetes mellitus (Mother)    FHx: prostate cancer (Father)    Vital Signs Last 24 Hrs  T(C): 36.7 (14 Jun 2023 13:26), Max: 37 (14 Jun 2023 02:55)  T(F): 98 (14 Jun 2023 13:26), Max: 98.6 (14 Jun 2023 02:55)  HR: 61 (14 Jun 2023 13:26) (61 - 67)  BP: 172/74 (14 Jun 2023 13:26) (138/78 - 201/100)  BP(mean): 126 (13 Jun 2023 20:26) (126 - 126)  RR: 18 (14 Jun 2023 13:26) (18 - 21)  SpO2: 95% (14 Jun 2023 13:26) (95% - 100%)    Parameters below as of 14 Jun 2023 13:26  Patient On (Oxygen Delivery Method): room air    PHYSICAL EXAM:    GENERAL: NAD, well-groomed, well-developed  HEAD:  Atraumatic, Normocephalic  EYES: EOMI, PERRLA, conjunctiva and sclera clear  ENMT: dry mucous breath  NECK: Supple, No JVD  NERVOUS SYSTEM:  Alert & Oriented X3, Good concentration; Motor Strength 5/5 B/L upper and lower extremities  CHEST/LUNG: Clear to auscultation bilaterally; No rales, rhonchi, wheezing, or rubs  HEART: Regular rate and rhythm; No murmurs, rubs, or gallops  ABDOMEN: Soft, Nontender, Nondistended; Bowel sounds present  EXTREMITIES:  2+ Peripheral Pulses, No clubbing, cyanosis, or edema  LYMPH: No lymphadenopathy noted  SKIN: No rashes or lesions    LABS:                        12.5   5.91  )-----------( 261      ( 14 Jun 2023 06:37 )             36.0     06-14    140  |  105  |  12  ----------------------------<  140<H>  3.6   |  24  |  0.54    Ca    8.9      14 Jun 2023 06:37  Phos  3.2     06-13  Mg     1.7     06-13    TPro  6.7  /  Alb  4.1  /  TBili  0.7  /  DBili  x   /  AST  26  /  ALT  34  /  AlkPhos  80  06-14    PT/INR - ( 13 Jun 2023 20:53 )   PT: 11.0 sec;   INR: 0.95 ratio         PTT - ( 13 Jun 2023 20:53 )  PTT:32.0 sec      RADIOLOGY & ADDITIONAL STUDIES:

## 2023-06-14 NOTE — H&P ADULT - NSHPREVIEWOFSYSTEMS_GEN_ALL_CORE
CONSTITUTIONAL: No weakness, fevers or chills  EYES/ENT: No visual changes;  No dysphagia  NECK: No pain or stiffness  RESPIRATORY: No cough, wheezing, hemoptysis; +  shortness of breath  CARDIOVASCULAR: + chest pain no  palpitations; No lower extremity edema  EXTREMITIES: no le edema, cyanosis, clubbing  GASTROINTESTINAL: No abdominal or epigastric pain. +  nausea, no  vomiting, or hematemesis; No diarrhea or constipation. No melena or hematochezia.  BACK: No back pain  GENITOURINARY: No dysuria, frequency or hematuria  NEUROLOGICAL: No numbness or weakness  MSK: no joint swelling or pain  SKIN: No itching, burning, rashes, or lesions   PSYCH: no agitation  All other review of systems is negative unless indicated above.

## 2023-06-14 NOTE — DISCHARGE NOTE PROVIDER - CARE PROVIDER_API CALL
Suraj Davis  Internal Medicine  [address] [facility], [address]  [place] [address]  Phone: [phone]  Fax: [phone]  Follow Up Time:

## 2023-06-14 NOTE — H&P ADULT - NSICDXPASTMEDICALHX_GEN_ALL_CORE_FT
PAST MEDICAL HISTORY:  Asthma     Brain aneurysm     Diabetes mellitus     H/O migraine     Hypertension

## 2023-06-14 NOTE — H&P ADULT - NSHPPHYSICALEXAM_GEN_ALL_CORE
Vital Signs Last 24 Hrs  T(C): 37 (14 Jun 2023 02:55), Max: 37 (14 Jun 2023 02:55)  T(F): 98.6 (14 Jun 2023 02:55), Max: 98.6 (14 Jun 2023 02:55)  HR: 61 (14 Jun 2023 02:55) (61 - 68)  BP: 150/66 (14 Jun 2023 02:55) (150/66 - 201/100)  BP(mean): 126 (13 Jun 2023 20:26) (126 - 126)  RR: 19 (14 Jun 2023 02:55) (16 - 21)  SpO2: 100% (14 Jun 2023 02:55) (97% - 100%)    Parameters below as of 14 Jun 2023 02:55  Patient On (Oxygen Delivery Method): room air Vital Signs Last 24 Hrs  T(C): 37 (14 Jun 2023 02:55), Max: 37 (14 Jun 2023 02:55)  T(F): 98.6 (14 Jun 2023 02:55), Max: 98.6 (14 Jun 2023 02:55)  HR: 61 (14 Jun 2023 02:55) (61 - 68)  BP: 150/66 (14 Jun 2023 02:55) (150/66 - 201/100)  BP(mean): 126 (13 Jun 2023 20:26) (126 - 126)  RR: 19 (14 Jun 2023 02:55) (16 - 21)  SpO2: 100% (14 Jun 2023 02:55) (97% - 100%)    Parameters below as of 14 Jun 2023 02:55  Patient On (Oxygen Delivery Method): room air    GENERAL: No acute distress, well-developed, overweight   HEAD:  Atraumatic, Normocephalic  ENT: EOMI, PERRLA, conjunctiva and sclera clear,  moist mucosa no pharyngeal erythema or exudates   NECK: supple , no JVD   CHEST/LUNG: Clear to auscultation bilaterally; No wheeze, equal breath sounds bilaterally   BACK: No spinal tenderness,  No CVA tenderness   HEART: Regular rate and rhythm; No murmurs, rubs, or gallops  ABDOMEN: Soft, Nontender, Nondistended; Bowel sounds present  EXTREMITIES:  No clubbing, cyanosis, or edema  MSK: No joint swelling or effusions, ROM intact   PSYCH: Normal behavior/affect  NEUROLOGY: AAOx3, non-focal, cranial nerves intact  SKIN: Normal color, No rashes or lesions

## 2023-06-14 NOTE — DISCHARGE NOTE PROVIDER - HOSPITAL COURSE
58 year old female with pmh of DMII, HTN, migraine, brain aneurysm presenting for chest pain for the past two days.           Chest pain.   ·  Plan: no acute ischemic changes , negative ddimer , clear CXR; given elevated cardiac risk , would benefit from inpatient stress testing   - Nuclear stress test -  - telemetry   - echo-      Diabetes mellitus.   ·  Plan: - Hold oral hypoglycemics  - insulin correction scale  - check fsg qac/qhs  - check a1c in am  - consistent carb diet.     HTN (hypertension).   ·  Plan: - continue Ramipril (therapeutic equivalent).     Asthma.   ·  Plan: - albuterol prn. 58 year old female with pmh of DMII, HTN, migraine, brain aneurysm presenting for chest pain for the past two days.           Chest pain.   ·  Plan: no acute ischemic changes , negative ddimer , clear CXR; given elevated cardiac risk ,   -Troponins negative x2  -No further cardiac work up needed Follow up with Dr. Davis out-pt     Diabetes mellitus.   ·  Plan: - Hold oral hypoglycemics  - insulin correction scale  - check fsg qac/qhs  - check a1c in am  - consistent carb diet.     HTN (hypertension).   ·  Plan: - continue Ramipril (therapeutic equivalent).     Asthma.   ·  Plan: - albuterol prn.

## 2023-06-14 NOTE — H&P ADULT - PROBLEM SELECTOR PLAN 1
no acute ischemic changes , negative ddimer , clear CXR; given elevated cardiac risk , would benefit from inpatient stress testing   - Nuclear stress test   - telemetry   - echo

## 2023-06-14 NOTE — DISCHARGE NOTE PROVIDER - NSDCCPCAREPLAN_GEN_ALL_CORE_FT
PRINCIPAL DISCHARGE DIAGNOSIS  Diagnosis: Acute chest pain  Assessment and Plan of Treatment: Chest pain resolved  EKG unremarkable  Cardiac enzymes negative  HOME CARE INSTRUCTIONS  For the next few days, avoid physical activities that bring on chest pain. Continue physical activities as directed.  Do not smoke.  Avoid drinking alcohol.   Only take over-the-counter or prescription medicine for pain, discomfort, or fever as directed by your caregiver.  Follow your caregiver's suggestions for further testing if your chest pain does not go away.  Keep any follow-up appointments you made. If you do not go to an appointment, you could develop lasting (chronic) problems with pain. If there is any problem keeping an appointment, you must call to reschedule.   SEEK MEDICAL CARE IF:  You think you are having problems from the medicine you are taking. Read your medicine instructions carefully.  Your chest pain does not go away, even after treatment.  You develop a rash with blisters on your chest.  SEEK IMMEDIATE MEDICAL CARE IF:  You have increased chest pain or pain that spreads to your arm, neck, jaw, back, or abdomen.   You develop shortness of breath, an increasing cough, or you are coughing up blood.  You have severe back or abdominal pain, feel nauseous, or vomit.  You develop severe weakness, fainting, or chills.  You have a fever.  THIS IS AN EMERGENCY. Do not wait to see if the pain will go away. Get medical help at once. Call your local emergency services (_____________________). Do not drive yourself to the hospital.

## 2023-06-14 NOTE — DISCHARGE NOTE PROVIDER - NSDCFUADDAPPT_GEN_ALL_CORE_FT
APPTS ARE READY TO BE MADE: [x] YES    Best Family or Patient Contact (if needed):    Additional Information about above appointments (if needed):    1:   2:   3:     Other comments or requests:    APPTS ARE READY TO BE MADE: [x] YES    Best Family or Patient Contact (if needed):    Additional Information about above appointments (if needed):    1:   2:   3:     Other comments or requests:   Patient was previously scheduled with Suraj aBrtlett on 06/19 at [address] [place] [address], [address] [place] [id number]

## 2023-06-14 NOTE — H&P ADULT - NSHPLABSRESULTS_GEN_ALL_CORE
Labs personally reviewed:                          13.0   6.97  )-----------( 272      ( 13 Jun 2023 18:49 )             38.5     06-13    138  |  102  |  14  ----------------------------<  158<H>  3.9   |  24  |  0.56    Ca    9.5      13 Jun 2023 18:49  Phos  3.2     06-13  Mg     1.7     06-13    TPro  7.1  /  Alb  4.5  /  TBili  0.3  /  DBili  x   /  AST  27  /  ALT  34  /  AlkPhos  103  06-13        LIVER FUNCTIONS - ( 13 Jun 2023 18:49 )  Alb: 4.5 g/dL / Pro: 7.1 g/dL / ALK PHOS: 103 U/L / ALT: 34 U/L / AST: 27 U/L / GGT: x           PT/INR - ( 13 Jun 2023 20:53 )   PT: 11.0 sec;   INR: 0.95 ratio         PTT - ( 13 Jun 2023 20:53 )  PTT:32.0 sec    CAPILLARY BLOOD GLUCOSE          Imaging:  CXR personally reviewed: no focal opacity    EKG personally reviewed:  Sinus bradycardia at 52 bpm , no acute s-t elevation or depression

## 2023-06-14 NOTE — PATIENT PROFILE ADULT - FALL HARM RISK - HARM RISK INTERVENTIONS

## 2023-06-14 NOTE — H&P ADULT - NSICDXFAMILYHX_GEN_ALL_CORE_FT
FAMILY HISTORY:  Father  Still living? Unknown  FHx: prostate cancer, Age at diagnosis: Age Unknown    Mother  Still living? Unknown  FH: diabetes mellitus, Age at diagnosis: Age Unknown

## 2023-06-15 ENCOUNTER — TRANSCRIPTION ENCOUNTER (OUTPATIENT)
Age: 58
End: 2023-06-15

## 2023-06-15 VITALS
OXYGEN SATURATION: 95 % | HEART RATE: 50 BPM | RESPIRATION RATE: 18 BRPM | SYSTOLIC BLOOD PRESSURE: 138 MMHG | DIASTOLIC BLOOD PRESSURE: 82 MMHG | TEMPERATURE: 98 F

## 2023-06-15 LAB
GLUCOSE BLDC GLUCOMTR-MCNC: 132 MG/DL — HIGH (ref 70–99)
GLUCOSE BLDC GLUCOMTR-MCNC: 142 MG/DL — HIGH (ref 70–99)

## 2023-06-15 PROCEDURE — 85379 FIBRIN DEGRADATION QUANT: CPT

## 2023-06-15 PROCEDURE — 85730 THROMBOPLASTIN TIME PARTIAL: CPT

## 2023-06-15 PROCEDURE — 80061 LIPID PANEL: CPT

## 2023-06-15 PROCEDURE — 84100 ASSAY OF PHOSPHORUS: CPT

## 2023-06-15 PROCEDURE — 99285 EMERGENCY DEPT VISIT HI MDM: CPT

## 2023-06-15 PROCEDURE — 83690 ASSAY OF LIPASE: CPT

## 2023-06-15 PROCEDURE — 36415 COLL VENOUS BLD VENIPUNCTURE: CPT

## 2023-06-15 PROCEDURE — 83036 HEMOGLOBIN GLYCOSYLATED A1C: CPT

## 2023-06-15 PROCEDURE — 71046 X-RAY EXAM CHEST 2 VIEWS: CPT

## 2023-06-15 PROCEDURE — 93306 TTE W/DOPPLER COMPLETE: CPT

## 2023-06-15 PROCEDURE — 76376 3D RENDER W/INTRP POSTPROCES: CPT

## 2023-06-15 PROCEDURE — 93356 MYOCRD STRAIN IMG SPCKL TRCK: CPT

## 2023-06-15 PROCEDURE — 78452 HT MUSCLE IMAGE SPECT MULT: CPT

## 2023-06-15 PROCEDURE — 85610 PROTHROMBIN TIME: CPT

## 2023-06-15 PROCEDURE — 84484 ASSAY OF TROPONIN QUANT: CPT

## 2023-06-15 PROCEDURE — 85025 COMPLETE CBC W/AUTO DIFF WBC: CPT

## 2023-06-15 PROCEDURE — 93017 CV STRESS TEST TRACING ONLY: CPT

## 2023-06-15 PROCEDURE — 82962 GLUCOSE BLOOD TEST: CPT

## 2023-06-15 PROCEDURE — A9500: CPT

## 2023-06-15 PROCEDURE — 83735 ASSAY OF MAGNESIUM: CPT

## 2023-06-15 PROCEDURE — 80053 COMPREHEN METABOLIC PANEL: CPT

## 2023-06-15 RX ORDER — CHLORHEXIDINE GLUCONATE 213 G/1000ML
1 SOLUTION TOPICAL
Refills: 0 | Status: DISCONTINUED | OUTPATIENT
Start: 2023-06-15 | End: 2023-06-15

## 2023-06-15 RX ADMIN — Medication 650 MILLIGRAM(S): at 06:53

## 2023-06-15 RX ADMIN — Medication 650 MILLIGRAM(S): at 06:20

## 2023-06-15 RX ADMIN — CHLORHEXIDINE GLUCONATE 1 APPLICATION(S): 213 SOLUTION TOPICAL at 13:33

## 2023-06-15 RX ADMIN — LISINOPRIL 40 MILLIGRAM(S): 2.5 TABLET ORAL at 05:50

## 2023-06-15 NOTE — CONSULT NOTE ADULT - SUBJECTIVE AND OBJECTIVE BOX
CHIEF COMPLAINT:Patient is a 58y old  Female who presents with a chief complaint of chest pain x 2 days (14 Jun 2023 19:10)      HISTORY OF PRESENT ILLNESS:    58 female with history as below presents with cp constant worse with standing or movement   now much better  ros otherwise neg     PAST MEDICAL & SURGICAL HISTORY:  Hypertension      Diabetes mellitus      Brain aneurysm      H/O migraine      Asthma      S/P cholecystectomy      H/O hernia repair              MEDICATIONS:  lisinopril 40 milliGRAM(s) Oral daily        acetaminophen     Tablet .. 650 milliGRAM(s) Oral every 6 hours PRN  ibuprofen  Tablet. 600 milliGRAM(s) Oral every 8 hours PRN  melatonin 3 milliGRAM(s) Oral at bedtime PRN  ondansetron Injectable 4 milliGRAM(s) IV Push every 8 hours PRN    aluminum hydroxide/magnesium hydroxide/simethicone Suspension 30 milliLiter(s) Oral every 4 hours PRN    dextrose 50% Injectable 12.5 Gram(s) IV Push once  dextrose 50% Injectable 25 Gram(s) IV Push once  dextrose Oral Gel 15 Gram(s) Oral once PRN  glucagon  Injectable 1 milliGRAM(s) IntraMuscular once  insulin lispro (ADMELOG) corrective regimen sliding scale   SubCutaneous three times a day before meals  insulin lispro (ADMELOG) corrective regimen sliding scale   SubCutaneous at bedtime    dextrose 5%. 1000 milliLiter(s) IV Continuous <Continuous>  dextrose 5%. 1000 milliLiter(s) IV Continuous <Continuous>      FAMILY HISTORY:  FH: diabetes mellitus (Mother)    FHx: prostate cancer (Father)        Non-contributory    SOCIAL HISTORY:    No tobacco, drugs or etoh    Allergies    No Known Allergies    Intolerances    	    REVIEW OF SYSTEMS:  as above  The rest of the 14 points ROS reviewed and except above they are unremarkable.        PHYSICAL EXAM:  T(C): 36.4 (06-15-23 @ 04:00), Max: 36.7 (06-14-23 @ 13:26)  HR: 56 (06-15-23 @ 05:43) (56 - 69)  BP: 151/74 (06-15-23 @ 05:43) (126/75 - 172/74)  RR: 18 (06-15-23 @ 05:43) (17 - 18)  SpO2: 95% (06-15-23 @ 05:43) (94% - 98%)  Wt(kg): --  I&O's Summary      Appearance: Normal	  HEENT:   no gross abnormality   Cardiovascular: Normal S1 S2,    Murmur:   Neck: JVP normal  Respiratory: Lungs clear   Gastrointestinal:  Soft, Non-tender  Skin: normal   Neuro: No gross deficits.   Psychiatry:  Mood & affect flat  Ext: No edema    LABS/DATA:    TELEMETRY: 	    ECG:  	   	  CARDIAC MARKERS:                        <6 <<== 06-13-23 @ 23:04                <6 <<== 06-13-23 @ 18:49                              12.5   5.91  )-----------( 261      ( 14 Jun 2023 06:37 )             36.0     06-14    140  |  105  |  12  ----------------------------<  140<H>  3.6   |  24  |  0.54    Ca    8.9      14 Jun 2023 06:37  Phos  3.2     06-13  Mg     1.7     06-13    TPro  6.7  /  Alb  4.1  /  TBili  0.7  /  DBili  x   /  AST  26  /  ALT  34  /  AlkPhos  80  06-14    proBNP:   Lipid Profile:   HgA1c:   TSH:     < from: TTE W or WO Ultrasound Enhancing Agent (06.14.23 @ 10:17) >      1. Normal left ventricular cavity size. The left ventricular wall thickness is normal. The left ventricular systolic function is normal with an ejection fraction of 67 % by 3D. There are no regional wall motion abnormalities seen.   2. Global longitudinal strain is -21.8 % (normal < -18%). GLS was assessed on [facility] echo machine with a heart rate of 57 bpm and a blood pressure of 150/66 mmHg.   3. Normal right ventricular cavity size, normal wall thickness and normal systolic function. The tricuspid annular plane systolic excursion (TAPSE) is 2.0 cm (normal >=1.7 cm).   4. No significant valvular disease.   5. No pericardial effusion seen.   6. No prior echocardiogram is available for comparison.    < end of copied text >  < from: Nuclear Stress Test-Pharmacologic (Nuclear Stress Test-Pharmacologic .) (06.14.23 @ 16:01) >  IMPRESSIONS:Probably Normal Study  * Symptom: Shortness of breath, headache.  * HR Response: Appropriate.  * BP Response: Appropriate.  * Heart Rhythm: Sinus Bradycardia - 59 BPM.  * ECG Changes: T wave abnormality: inverted in leads III  * The left ventricle was normal in size. There are  small-sized fixed defects in the mid to distal  inferolateral, mid to basal anteroseptal, and inferoapical  walls that predominantly correct with prone imaging and  demonstrate normal wall motion suggestive of attenuation  artifact.  * Post-stress gated wall motion analysis was performed  (LVEF = 68 %;LVEDV = 74 ml.), revealing normal LV  function.  Conclusion:  The left ventricle was normal in size. There are  small-sized fixed defects in the mid to distal  inferolateral, mid to basal anteroseptal, and inferoapical  walls that predominantly correct with prone imaging and  demonstrate normal wall motion suggestive of attenuation  artifact.  ------------------------------------------------------------------------  Confirmed on  6/14/2023 - 17:23:35 by Jorge L Fraire M.D.  ------------------------------------------------------------------------    < end of copied text >

## 2023-06-15 NOTE — CONSULT NOTE ADULT - ASSESSMENT
chest pain   ruled out for MI / PE   echo shows normal LV function   stress test show no ischemia   sx resolved   likely MSK     HTN  cont current meds    DM II  Monitor finger stick. Insulin coverage. Diabetic education and Diabetic diet. Consider nutrition consultation.

## 2023-06-15 NOTE — DISCHARGE NOTE NURSING/CASE MANAGEMENT/SOCIAL WORK - PATIENT PORTAL LINK FT
You can access the FollowMyHealth Patient Portal offered by [facility] by registering at the following website: [email or web address]. By joining [employer]’s FollowMyHealth portal, you will also be able to view your health information using other applications (apps) compatible with our system.

## 2023-11-10 PROBLEM — Z86.69 PERSONAL HISTORY OF OTHER DISEASES OF THE NERVOUS SYSTEM AND SENSE ORGANS: Chronic | Status: ACTIVE | Noted: 2023-06-14

## 2023-11-10 PROBLEM — I67.1 CEREBRAL ANEURYSM, NONRUPTURED: Chronic | Status: ACTIVE | Noted: 2023-06-14

## 2023-11-10 PROBLEM — J45.909 UNSPECIFIED ASTHMA, UNCOMPLICATED: Chronic | Status: ACTIVE | Noted: 2023-06-14

## 2023-11-10 PROBLEM — I10 ESSENTIAL (PRIMARY) HYPERTENSION: Chronic | Status: ACTIVE | Noted: 2023-06-13

## 2023-11-10 PROBLEM — E11.9 TYPE 2 DIABETES MELLITUS WITHOUT COMPLICATIONS: Chronic | Status: ACTIVE | Noted: 2023-06-14

## 2023-11-13 ENCOUNTER — APPOINTMENT (OUTPATIENT)
Dept: OBGYN | Facility: CLINIC | Age: 58
End: 2023-11-13
Payer: COMMERCIAL

## 2023-11-13 VITALS
BODY MASS INDEX: 27.05 KG/M2 | HEART RATE: 66 BPM | SYSTOLIC BLOOD PRESSURE: 160 MMHG | HEIGHT: 62 IN | WEIGHT: 147 LBS | DIASTOLIC BLOOD PRESSURE: 82 MMHG

## 2023-11-13 DIAGNOSIS — Z01.419 ENCOUNTER FOR GYNECOLOGICAL EXAMINATION (GENERAL) (ROUTINE) W/OUT ABNORMAL FINDINGS: ICD-10-CM

## 2023-11-13 DIAGNOSIS — L28.0 LICHEN SIMPLEX CHRONICUS: ICD-10-CM

## 2023-11-13 DIAGNOSIS — Z80.42 FAMILY HISTORY OF MALIGNANT NEOPLASM OF PROSTATE: ICD-10-CM

## 2023-11-13 PROCEDURE — 99213 OFFICE O/P EST LOW 20 MIN: CPT | Mod: 25

## 2023-11-13 PROCEDURE — 99386 PREV VISIT NEW AGE 40-64: CPT

## 2023-11-13 RX ORDER — EMPAGLIFLOZIN 10 MG/1
10 TABLET, FILM COATED ORAL
Refills: 0 | Status: ACTIVE | COMMUNITY

## 2023-11-13 RX ORDER — CLOBETASOL PROPIONATE 0.5 MG/G
0.05 OINTMENT TOPICAL DAILY
Qty: 1 | Refills: 1 | Status: ACTIVE | COMMUNITY
Start: 2023-11-13 | End: 1900-01-01

## 2023-11-26 LAB
CYTOLOGY CVX/VAG DOC THIN PREP: ABNORMAL
HPV HIGH+LOW RISK DNA PNL CVX: NOT DETECTED

## 2023-12-07 ENCOUNTER — OUTPATIENT (OUTPATIENT)
Dept: OUTPATIENT SERVICES | Facility: HOSPITAL | Age: 58
LOS: 1 days | End: 2023-12-07
Payer: COMMERCIAL

## 2023-12-07 ENCOUNTER — APPOINTMENT (OUTPATIENT)
Dept: MRI IMAGING | Facility: CLINIC | Age: 58
End: 2023-12-07

## 2023-12-07 DIAGNOSIS — I67.1 CEREBRAL ANEURYSM, NONRUPTURED: ICD-10-CM

## 2023-12-07 DIAGNOSIS — Z90.49 ACQUIRED ABSENCE OF OTHER SPECIFIED PARTS OF DIGESTIVE TRACT: Chronic | ICD-10-CM

## 2023-12-07 DIAGNOSIS — Z98.890 OTHER SPECIFIED POSTPROCEDURAL STATES: Chronic | ICD-10-CM

## 2023-12-07 PROCEDURE — 70544 MR ANGIOGRAPHY HEAD W/O DYE: CPT

## 2023-12-07 PROCEDURE — 70544 MR ANGIOGRAPHY HEAD W/O DYE: CPT | Mod: 26

## 2023-12-10 ENCOUNTER — NON-APPOINTMENT (OUTPATIENT)
Age: 58
End: 2023-12-10

## 2024-01-25 ENCOUNTER — OUTPATIENT (OUTPATIENT)
Dept: OUTPATIENT SERVICES | Facility: HOSPITAL | Age: 59
LOS: 1 days | End: 2024-01-25
Payer: COMMERCIAL

## 2024-01-25 ENCOUNTER — APPOINTMENT (OUTPATIENT)
Dept: MAMMOGRAPHY | Facility: IMAGING CENTER | Age: 59
End: 2024-01-25
Payer: COMMERCIAL

## 2024-01-25 ENCOUNTER — APPOINTMENT (OUTPATIENT)
Dept: ULTRASOUND IMAGING | Facility: IMAGING CENTER | Age: 59
End: 2024-01-25
Payer: COMMERCIAL

## 2024-01-25 ENCOUNTER — RESULT REVIEW (OUTPATIENT)
Age: 59
End: 2024-01-25

## 2024-01-25 DIAGNOSIS — Z01.419 ENCOUNTER FOR GYNECOLOGICAL EXAMINATION (GENERAL) (ROUTINE) WITHOUT ABNORMAL FINDINGS: ICD-10-CM

## 2024-01-25 DIAGNOSIS — Z90.49 ACQUIRED ABSENCE OF OTHER SPECIFIED PARTS OF DIGESTIVE TRACT: Chronic | ICD-10-CM

## 2024-01-25 DIAGNOSIS — Z00.8 ENCOUNTER FOR OTHER GENERAL EXAMINATION: ICD-10-CM

## 2024-01-25 DIAGNOSIS — Z98.890 OTHER SPECIFIED POSTPROCEDURAL STATES: Chronic | ICD-10-CM

## 2024-01-25 PROCEDURE — 77067 SCR MAMMO BI INCL CAD: CPT

## 2024-01-25 PROCEDURE — 76641 ULTRASOUND BREAST COMPLETE: CPT

## 2024-01-25 PROCEDURE — 77067 SCR MAMMO BI INCL CAD: CPT | Mod: 26

## 2024-01-25 PROCEDURE — 77063 BREAST TOMOSYNTHESIS BI: CPT

## 2024-01-25 PROCEDURE — 76641 ULTRASOUND BREAST COMPLETE: CPT | Mod: 26,50

## 2024-01-25 PROCEDURE — 77063 BREAST TOMOSYNTHESIS BI: CPT | Mod: 26

## 2024-02-02 ENCOUNTER — APPOINTMENT (OUTPATIENT)
Dept: MAMMOGRAPHY | Facility: IMAGING CENTER | Age: 59
End: 2024-02-02
Payer: COMMERCIAL

## 2024-02-02 ENCOUNTER — RESULT REVIEW (OUTPATIENT)
Age: 59
End: 2024-02-02

## 2024-02-02 ENCOUNTER — APPOINTMENT (OUTPATIENT)
Dept: ULTRASOUND IMAGING | Facility: IMAGING CENTER | Age: 59
End: 2024-02-02
Payer: COMMERCIAL

## 2024-02-02 ENCOUNTER — OUTPATIENT (OUTPATIENT)
Dept: OUTPATIENT SERVICES | Facility: HOSPITAL | Age: 59
LOS: 1 days | End: 2024-02-02
Payer: COMMERCIAL

## 2024-02-02 DIAGNOSIS — Z90.49 ACQUIRED ABSENCE OF OTHER SPECIFIED PARTS OF DIGESTIVE TRACT: Chronic | ICD-10-CM

## 2024-02-02 DIAGNOSIS — Z98.890 OTHER SPECIFIED POSTPROCEDURAL STATES: Chronic | ICD-10-CM

## 2024-02-02 DIAGNOSIS — Z00.8 ENCOUNTER FOR OTHER GENERAL EXAMINATION: ICD-10-CM

## 2024-02-02 PROCEDURE — 77065 DX MAMMO INCL CAD UNI: CPT

## 2024-02-02 PROCEDURE — G0279: CPT

## 2024-02-02 PROCEDURE — 76642 ULTRASOUND BREAST LIMITED: CPT | Mod: 26,RT

## 2024-02-02 PROCEDURE — 76642 ULTRASOUND BREAST LIMITED: CPT

## 2024-02-02 PROCEDURE — 77065 DX MAMMO INCL CAD UNI: CPT | Mod: 26,RT

## 2024-02-02 PROCEDURE — G0279: CPT | Mod: 26

## 2024-03-22 ENCOUNTER — NON-APPOINTMENT (OUTPATIENT)
Age: 59
End: 2024-03-22

## 2024-08-26 ENCOUNTER — APPOINTMENT (OUTPATIENT)
Dept: ENDOCRINOLOGY | Facility: CLINIC | Age: 59
End: 2024-08-26

## 2025-05-22 ENCOUNTER — APPOINTMENT (OUTPATIENT)
Dept: MAMMOGRAPHY | Facility: IMAGING CENTER | Age: 60
End: 2025-05-22
Payer: COMMERCIAL

## 2025-05-22 ENCOUNTER — APPOINTMENT (OUTPATIENT)
Dept: ULTRASOUND IMAGING | Facility: IMAGING CENTER | Age: 60
End: 2025-05-22
Payer: COMMERCIAL

## 2025-05-22 ENCOUNTER — OUTPATIENT (OUTPATIENT)
Dept: OUTPATIENT SERVICES | Facility: HOSPITAL | Age: 60
LOS: 1 days | End: 2025-05-22
Payer: COMMERCIAL

## 2025-05-22 DIAGNOSIS — Z98.890 OTHER SPECIFIED POSTPROCEDURAL STATES: Chronic | ICD-10-CM

## 2025-05-22 DIAGNOSIS — Z90.49 ACQUIRED ABSENCE OF OTHER SPECIFIED PARTS OF DIGESTIVE TRACT: Chronic | ICD-10-CM

## 2025-05-22 DIAGNOSIS — Z00.8 ENCOUNTER FOR OTHER GENERAL EXAMINATION: ICD-10-CM

## 2025-05-22 PROCEDURE — 77063 BREAST TOMOSYNTHESIS BI: CPT

## 2025-05-22 PROCEDURE — 77063 BREAST TOMOSYNTHESIS BI: CPT | Mod: 26

## 2025-05-22 PROCEDURE — 77067 SCR MAMMO BI INCL CAD: CPT | Mod: 26

## 2025-05-22 PROCEDURE — 76641 ULTRASOUND BREAST COMPLETE: CPT | Mod: 26,50

## 2025-05-22 PROCEDURE — 76641 ULTRASOUND BREAST COMPLETE: CPT

## 2025-05-22 PROCEDURE — 77067 SCR MAMMO BI INCL CAD: CPT

## 2025-06-18 ENCOUNTER — NON-APPOINTMENT (OUTPATIENT)
Age: 60
End: 2025-06-18

## 2025-06-19 ENCOUNTER — NON-APPOINTMENT (OUTPATIENT)
Age: 60
End: 2025-06-19

## 2025-06-19 ENCOUNTER — APPOINTMENT (OUTPATIENT)
Dept: OBGYN | Facility: CLINIC | Age: 60
End: 2025-06-19
Payer: COMMERCIAL

## 2025-06-19 VITALS
HEIGHT: 62 IN | BODY MASS INDEX: 27.6 KG/M2 | WEIGHT: 150 LBS | DIASTOLIC BLOOD PRESSURE: 60 MMHG | SYSTOLIC BLOOD PRESSURE: 110 MMHG

## 2025-06-19 PROCEDURE — 99459 PELVIC EXAMINATION: CPT

## 2025-06-19 PROCEDURE — G0444 DEPRESSION SCREEN ANNUAL: CPT | Mod: 59

## 2025-06-19 PROCEDURE — 99396 PREV VISIT EST AGE 40-64: CPT | Mod: 25

## 2025-07-11 ENCOUNTER — NON-APPOINTMENT (OUTPATIENT)
Age: 60
End: 2025-07-11